# Patient Record
Sex: FEMALE | Race: WHITE
[De-identification: names, ages, dates, MRNs, and addresses within clinical notes are randomized per-mention and may not be internally consistent; named-entity substitution may affect disease eponyms.]

---

## 2017-01-19 ENCOUNTER — HOSPITAL ENCOUNTER (OUTPATIENT)
Dept: HOSPITAL 47 - RADMAMWWP | Age: 82
Discharge: HOME | End: 2017-01-19
Payer: MEDICARE

## 2017-01-19 DIAGNOSIS — Z12.31: Primary | ICD-10-CM

## 2017-01-20 NOTE — MM
Reason for exam: screening  (asymptomatic).

Last mammogram was performed 1 year and 3 months ago.



History:

Patient is postmenopausal and history of other cancer.

Cyst aspiration of the left breast, 1960.

Took estrogen for 10 years beginning at age 56.



Physical Findings:

A clinical breast exam by your physician is recommended on an annual basis and 

results should be correlated with mammographic findings.



MG Screening Mammo w CAD

Bilateral CC and MLO view(s) were taken.

Prior study comparison: October 23, 2015, right breast MG 3d work up w/cad RT.  

October 12, 2015, bilateral MG screening mammo w CAD.

There are scattered fibroglandular densities.  No significant changes when 

compared with prior studies.





ASSESSMENT: Benign, BI-RAD 2



RECOMMENDATION:

Routine screening mammogram of both breasts in 1 year.

## 2017-02-07 ENCOUNTER — HOSPITAL ENCOUNTER (OUTPATIENT)
Dept: HOSPITAL 47 - RADECHMAIN | Age: 82
Discharge: HOME | End: 2017-02-07
Payer: MEDICARE

## 2017-02-07 DIAGNOSIS — I27.2: ICD-10-CM

## 2017-02-07 DIAGNOSIS — I08.8: Primary | ICD-10-CM

## 2017-02-07 PROCEDURE — 93306 TTE W/DOPPLER COMPLETE: CPT

## 2017-02-07 PROCEDURE — 93880 EXTRACRANIAL BILAT STUDY: CPT

## 2017-02-07 NOTE — US
EXAMINATION TYPE: US carotid duplex BILAT

 

DATE OF EXAM: 2/7/2017 2:50 PM

 

COMPARISON: NONE

 

CLINICAL HISTORY: R55 syncope.

 

EXAM MEASUREMENTS: 

 

RIGHT:  Peak Systolic Velocity (PSV) cm/sec

----- Right CCA:  96.8  

----- Right ICA:  75.3     

----- Right ECA:  96.8   

ICA/CCA ratio:  0.8    

 

RIGHT:  End Diastole cm/sec

----- Right CCA:  27.3   

----- Right ICA:  25.2      

----- Right ECA:  8.1     

 

LEFT:  Peak Systolic Velocity (PSV) cm/sec

----- Left CCA:  98.0  

----- Left ICA:  109.4   

----- Left ECA:  90.4  

ICA/CCA ratio:  1.1  

 

LEFT:  End Diastole cm/sec

----- Left CCA:  24.8  

----- Left ICA:  27.3   

----- Left ECA:  10.9 

 

VERTEBRALS (direction of flow):

Right Vertebral: Antegrade

Left Vertebral: Antegrade

 

TECHNOLOGIST IMPRESSION:  Tortuous proximal CCA noted bilaterally. Mild to moderate intimal wall wheeler
ges are noted at bilateral carotid bifurcation , but PSV is wnl.

 

Grayscale images show mild eccentric plaque at bilateral carotid bulbs. Velocity measurements and rat
ios are within normal limits in visualized portion of both internal carotid arteries.

 

IMPRESSION:  No hemodynamically significant stenosis identified in either internal carotid artery.

## 2017-02-08 NOTE — ECHOF
Referral Reason:R55 syncope



MEASUREMENTS

--------

HEIGHT: 149.9 cm

WEIGHT: 63.5 kg

BP: 190/81

RVIDd:   2.8 cm     (< 3.3)

IVSd:   1.1 cm     (0.6 - 1.1)

LVIDd:   3.7 cm     (3.9 - 5.3)

LVPWd:   1.1 cm     (0.6 - 1.1)

IVSs:   1.6 cm

LVIDs:   2.5 cm

LVPWs:   1.5 cm

LA Diam:   3.5 cm     (2.7 - 3.8)

LAESV Index (A-L):   30.98 ml/m

Ao Diam:   3.1 cm     (2.0 - 3.7)

AV Cusp:   1.5 cm     (1.5 - 2.6)

MV EXCURSION:   9.414 mm     (> 18.000)

MV EF SLOPE:   46 mm/s     (70 - 150)

EPSS:   0.5 cm

MV E Ga:   1.04 m/s

MV DecT:   241 ms

MV A Ga:   1.22 m/s

MV E/A Ratio:   0.85 

AV maxP.63 mmHg

AV meanP.48 mmHg

RAP:   5.00 mmHg

RVSP:   40.01 mmHg







FINDINGS

--------

Sinus rhythm.

This was a technically good study.

The left ventricular size is normal.   There is 

borderline concentric left ventricular hypertrophy.   

Overall left ventricular systolic function is normal 

with, an EF between 60 - 65 %.

The right ventricle is normal in size and function.

LA is midly dilated 29-33ml/m2.

The right atrium is normal in size.

Aortic valve is trileaflet and is moderately thickened. 

  Trace amount of aortic regurgitation.    Peak/mean 

gradient across the Aortic Valve is 11.63mmHg / 

6.48mmHg.

The mitral valve leaflets are mildly thickened.   Mild 

mitral annular calcification present.   Mild mitral 

regurgitation is present.

Mild tricuspid regurgitation present.   There is mild 

pulmonary hypertension.   The right ventricular 

systolic pressure, as measured by Doppler, is 40.01mmHg.

Trace/mild (physiologic)  pulmonic regurgitation.

The aortic root size is normal.

Normal inferior vena cava with normal inspiratory 

collapse consistent with estimated right atrial 

pressure of  5 mmHg.

There is no pericardial effusion.



CONCLUSIONS

--------

1. Sinus rhythm.

2. Trace amount of aortic regurgitation.

3. Peak/mean gradient across the Aortic Valve is 11.63mmHg 

/ 6.48mmHg.

4. The mitral valve leaflets are mildly thickened.

5. Mild mitral annular calcification present.

6. Mild mitral regurgitation is present.

7. Mild tricuspid regurgitation present.

8. There is mild pulmonary hypertension.

9. The right ventricular systolic pressure, as measured by 

Doppler, is 40.01mmHg.

10. Trace/mild (physiologic)  pulmonic regurgitation.

11. The aortic root size is normal.

12. This was a technically good study.

13. Normal inferior vena cava with normal inspiratory 

collapse consistent with estimated right atrial 

pressure of  5 mmHg.

14. There is no pericardial effusion.

15. The left ventricular size is normal.

16. There is borderline concentric left ventricular 

hypertrophy.

17. Overall left ventricular systolic function is normal 

with, an EF between 60 - 65 %.

18. The right ventricle is normal in size and function.

19. LA is midly dilated 29-33ml/m2.

20. The right atrium is normal in size.

21. Aortic valve is trileaflet and is moderately thickened.





SONOGRAPHER: Danay Lindo RDCS

## 2018-04-24 ENCOUNTER — HOSPITAL ENCOUNTER (OUTPATIENT)
Dept: HOSPITAL 47 - RADMAMWWP | Age: 83
Discharge: HOME | End: 2018-04-24
Payer: MEDICARE

## 2018-04-24 DIAGNOSIS — Z13.820: ICD-10-CM

## 2018-04-24 DIAGNOSIS — Z12.31: Primary | ICD-10-CM

## 2018-04-24 PROCEDURE — 77080 DXA BONE DENSITY AXIAL: CPT

## 2018-04-24 PROCEDURE — 77063 BREAST TOMOSYNTHESIS BI: CPT

## 2018-04-24 PROCEDURE — 77067 SCR MAMMO BI INCL CAD: CPT

## 2018-04-24 NOTE — BD
EXAMINATION TYPE: MG DEXA axial skeleton.  

 

DATE OF EXAM: 4/24/2018

 

CLINICAL HISTORY:M81.0 Osteoporosis

 

 

Height:  58 inches

Weight:  146

 

FRAX RISK QUESTIONS:

Alcohol (3 or more units per day):  no

Family History (Parent hip fracture):  no

Glucocorticoids (More than 3mos):  no

           (Ex: prednisone, prednisolone, methylprednisolone, dexamethasone, and hydrocortisone).    
     

History of Fracture in Adulthood: no

Secondary Osteoporosis: 

  1.  Type 1 Diabetes: no

  2.  Hyperthyroidism: no

  3.  Menopause before 45: no

  4.  Malnutrition: no

  5.  Chronic liver disease: no

Rheumatoid Arthritis: no

Current Tobacco Use: no

 

RISK FACTORS 

HISTORY OF: 

Family History of Osteoporosis: no

Active: yes

Diet low in dairy products/other sources of calcium:  no

Postmenopausal woman: yes

Take estrogen and/or progesterone medications: not now

How long: age [age]

Lost more than 2 inches in height since high school: yes

Frequent falls: no

Poor Health: no

Hyperparathyroidism: no

Adrenal Insufficiency: no

 

MEDICATIONS: 

Prednisone or other steroids: no

Thyroid Medications: no 

Osteoporosis Medications: no

Additional Medications: blood pressure meds, cholesterol meds, multivitamin 

 

 

Additional History: knee surgery

 

 

EXAM MEASUREMENTS: 

Bone mineral densitometry was performed using the [employer] System.

Bone mineral density as measured about the Lumbar spine is:  

----- L1-L4(G/cm2): 1.464

T Score Values are as follows:

----- L2: 2.0

----- L3: 3.3

----- L4: 2.8

----- L1-L4: 2.4

Bone mineral density has: Decreased -3.0% since study of: 07/10/2013

 

Bone mineral density about the R hip (g/cm2): 0.951

Bone mineral density about the L hip (g/cm2): 0.979

T Score values are as follows:

-----R Neck: -0.6

-----L Neck: -0.4

-----R Total: -0.2

-----L Total: -0.1

Bone mineral density has: Decreased -1.8% since study of: 07/10/2013

 

 

IMPRESSION:

No evidence for osteoporosis or osteopenia.

 

 

 

 

 

NOTE:  T-SCORE=SD OF THE YOUNG ADULT MEAN.

## 2018-10-08 ENCOUNTER — HOSPITAL ENCOUNTER (OUTPATIENT)
Dept: HOSPITAL 47 - LABWHC1 | Age: 83
Discharge: HOME | End: 2018-10-08
Attending: NURSE PRACTITIONER
Payer: MEDICARE

## 2018-10-08 DIAGNOSIS — Z00.00: Primary | ICD-10-CM

## 2018-10-08 DIAGNOSIS — I10: ICD-10-CM

## 2018-10-08 DIAGNOSIS — E78.5: ICD-10-CM

## 2018-10-08 LAB
ALBUMIN SERPL-MCNC: 4.3 G/DL (ref 3.5–5)
ALP SERPL-CCNC: 118 U/L (ref 38–126)
ALT SERPL-CCNC: 11 U/L (ref 9–52)
ANION GAP SERPL CALC-SCNC: 10 MMOL/L
AST SERPL-CCNC: 25 U/L (ref 14–36)
BASOPHILS # BLD AUTO: 0.1 K/UL (ref 0–0.2)
BASOPHILS NFR BLD AUTO: 1 %
BUN SERPL-SCNC: 19 MG/DL (ref 7–17)
CALCIUM SPEC-MCNC: 10 MG/DL (ref 8.4–10.2)
CHLORIDE SERPL-SCNC: 107 MMOL/L (ref 98–107)
CHOLEST SERPL-MCNC: 212 MG/DL (ref ?–200)
CO2 SERPL-SCNC: 26 MMOL/L (ref 22–30)
EOSINOPHIL # BLD AUTO: 0.2 K/UL (ref 0–0.7)
EOSINOPHIL NFR BLD AUTO: 3 %
ERYTHROCYTE [DISTWIDTH] IN BLOOD BY AUTOMATED COUNT: 4.83 M/UL (ref 3.8–5.4)
ERYTHROCYTE [DISTWIDTH] IN BLOOD: 14.1 % (ref 11.5–15.5)
GLUCOSE SERPL-MCNC: 88 MG/DL (ref 74–99)
HCT VFR BLD AUTO: 40.9 % (ref 34–46)
HDLC SERPL-MCNC: 65 MG/DL (ref 40–60)
HGB BLD-MCNC: 13.7 GM/DL (ref 11.4–16)
LDLC SERPL CALC-MCNC: 124 MG/DL (ref 0–99)
LYMPHOCYTES # SPEC AUTO: 1.2 K/UL (ref 1–4.8)
LYMPHOCYTES NFR SPEC AUTO: 22 %
MCH RBC QN AUTO: 28.3 PG (ref 25–35)
MCHC RBC AUTO-ENTMCNC: 33.5 G/DL (ref 31–37)
MCV RBC AUTO: 84.6 FL (ref 80–100)
MONOCYTES # BLD AUTO: 0.3 K/UL (ref 0–1)
MONOCYTES NFR BLD AUTO: 6 %
NEUTROPHILS # BLD AUTO: 3.8 K/UL (ref 1.3–7.7)
NEUTROPHILS NFR BLD AUTO: 67 %
PLATELET # BLD AUTO: 288 K/UL (ref 150–450)
POTASSIUM SERPL-SCNC: 4.5 MMOL/L (ref 3.5–5.1)
PROT SERPL-MCNC: 7.6 G/DL (ref 6.3–8.2)
SODIUM SERPL-SCNC: 143 MMOL/L (ref 137–145)
T4 FREE SERPL-MCNC: 1.03 NG/DL (ref 0.78–2.19)
TRIGL SERPL-MCNC: 115 MG/DL (ref ?–150)
WBC # BLD AUTO: 5.7 K/UL (ref 3.8–10.6)

## 2018-10-08 PROCEDURE — 36415 COLL VENOUS BLD VENIPUNCTURE: CPT

## 2018-10-08 PROCEDURE — 85025 COMPLETE CBC W/AUTO DIFF WBC: CPT

## 2018-10-08 PROCEDURE — 84443 ASSAY THYROID STIM HORMONE: CPT

## 2018-10-08 PROCEDURE — 84439 ASSAY OF FREE THYROXINE: CPT

## 2018-10-08 PROCEDURE — 80061 LIPID PANEL: CPT

## 2018-10-08 PROCEDURE — 80053 COMPREHEN METABOLIC PANEL: CPT

## 2019-04-25 ENCOUNTER — HOSPITAL ENCOUNTER (OUTPATIENT)
Dept: HOSPITAL 47 - RADUSWWP | Age: 84
Discharge: HOME | End: 2019-04-25
Attending: FAMILY MEDICINE
Payer: MEDICARE

## 2019-04-25 DIAGNOSIS — I27.20: ICD-10-CM

## 2019-04-25 DIAGNOSIS — I08.8: Primary | ICD-10-CM

## 2019-04-25 DIAGNOSIS — I70.213: ICD-10-CM

## 2019-04-25 PROCEDURE — 93922 UPR/L XTREMITY ART 2 LEVELS: CPT

## 2019-04-25 PROCEDURE — 93306 TTE W/DOPPLER COMPLETE: CPT

## 2019-04-25 NOTE — ECHOF
Referral Reason:R01.0 cardiac murmur



MEASUREMENTS

--------

HEIGHT: 147.3 cm

WEIGHT: 65.8 kg

BP: 146/60

RVIDd:   3.0 cm     (< 3.3)

IVSd:   1.1 cm     (0.6 - 1.1)

LVIDd:   3.5 cm     (3.9 - 5.3)

LVPWd:   1.2 cm     (0.6 - 1.1)

IVSs:   1.6 cm

LVIDs:   2.3 cm

LVPWs:   1.6 cm

LA Diam:   3.5 cm     (2.7 - 3.8)

LAESV Index (A-L):   29.31 ml/m

Ao Diam:   2.7 cm     (2.0 - 3.7)

AV Cusp:   1.2 cm     (1.5 - 2.6)

EPSS:   0.3 cm

MV E Ga:   0.89 m/s

MV DecT:   292 ms

MV A Ga:   1.12 m/s

MV E/A Ratio:   0.80 

AV maxP.40 mmHg

AV maxP.40 mmHg

AV meanP.44 mmHg

RAP:   5.00 mmHg

RVSP:   36.35 mmHg

MV EF SLOPE:   70.50 mm/s     (70 - 150)

MV EXCURSION:   1.69 cm     (> 18.000)







FINDINGS

--------

Sinus rhythm.

This was a technically good study.

The left ventricular size is normal.   There is borderline concentric left ventricular hypertrophy.  
 Overall left ventricular systolic function is normal with, an EF between 60 - 65 %.

The right ventricle is normal in size.

LA is midly dilated 29-33ml/m2.

The right atrium is normal in size.

There is moderate aortic valve sclerosis.   Trace to mild aortic regurgitation.   There is mild aorti
c stenosis present.

The mitral valve leaflets are mildly thickened.   Moderate mitral annular calcification present.    T
he peak and mean MV gradients are {MV maxPG} {MV meanPG} as measured by doppler.

Mild tricuspid regurgitation present.   There is mild pulmonary hypertension.   The right ventricular
 systolic pressure, as measured by Doppler, is 36.35mmHg.

Trace/mild (physiologic)  pulmonic regurgitation.

The aortic root size is normal.

Normal inferior vena cava with normal inspiratory collapse consistent with estimated right atrial pre
ssure of  5 mmHg.

There is no pericardial effusion.



CONCLUSIONS

--------

1. Sinus rhythm.

2. This was a technically good study.

3. The left ventricular size is normal.

4. There is borderline concentric left ventricular hypertrophy.

5. Overall left ventricular systolic function is normal with, an EF between 60 - 65 %.

6. The right ventricle is normal in size.

7. LA is midly dilated 29-33ml/m2.

8. The right atrium is normal in size.

9. There is moderate aortic valve sclerosis.

10. Trace to mild aortic regurgitation.

11. There is mild aortic stenosis present.

12. The mitral valve leaflets are mildly thickened.

13. Moderate mitral annular calcification present.

14. The peak and mean MV gradients are {MV maxPG} {MV meanPG} as measured by doppler.

15. Mild tricuspid regurgitation present.

16. There is mild pulmonary hypertension.

17. The right ventricular systolic pressure, as measured by Doppler, is 36.35mmHg.

18. Trace/mild (physiologic)  pulmonic regurgitation.

19. The aortic root size is normal.

20. Normal inferior vena cava with normal inspiratory collapse consistent with estimated right atrial
 pressure of  5 mmHg.

21. There is no pericardial effusion.





SONOGRAPHER: BRUCE Hardy

## 2021-11-04 ENCOUNTER — HOSPITAL ENCOUNTER (OUTPATIENT)
Dept: HOSPITAL 47 - RADCTMAIN | Age: 86
Discharge: HOME | End: 2021-11-04
Attending: FAMILY MEDICINE
Payer: MEDICARE

## 2021-11-04 DIAGNOSIS — M53.3: Primary | ICD-10-CM

## 2021-11-04 DIAGNOSIS — M54.50: ICD-10-CM

## 2021-11-04 PROCEDURE — 72131 CT LUMBAR SPINE W/O DYE: CPT

## 2021-11-04 PROCEDURE — 72192 CT PELVIS W/O DYE: CPT

## 2021-11-04 NOTE — CT
EXAMINATION TYPE: CT lumbar spine wo con, CT pelvis wo con

 

DATE OF EXAM: 11/4/2021 11:06 AM

 

COMPARISON: None.

 

HISTORY: Pain lumbar and pelvic in particular coccyx pain

 

CT DLP: 658.8 mGycm

Automated exposure control for dose reduction was used.

 

Unenhanced CT of the lumbar spine and pelvis are performed.  Bone and soft tissue window settings are
 submitted as well as coronal and sagittal reconstructions. 

 

There are 5 lumbar type vertebra. Native osseous structures are demineralized. There is grade 1 retro
listhesis of L1 on L2. There is grade 1 anterolisthesis of L5 on S1. Vertebral body heights are prese
rved. Moderate-to-severe multilevel anterior lateral spurring. There is levoconvex scoliosis centered
 at L4 level. Moderate to severe multilevel disc space narrowing. Posterior spur disc complexes effac
e the anterior thecal sac at all levels on sagittal images. 

 

Axial images L2-L3 level show moderate broad-based posterior disc protrusion and spurring along with 
mild facet arthropathy causing mild left greater than right neural foraminal narrowing.

 

Axial images at L3-L4 level show moderate to advanced facet arthropathy effacing posterior lateral th
ecal sac. There is moderate broad-based disc bulge effacing the anterior thecal sac. There is moderat
e bilateral neural foraminal narrowing.

 

Axial images at the L4-L5 level show moderate to advanced right and mild to moderate left facet arthr
opathy. There is broad disc bulge with right lateral disc protrusion component. There is mild left an
d moderate to severe right-sided neural foraminal narrowing.

 

Axial images at L5-S1 level show moderate to advanced facet arthropathy bilaterally. There is broad-b
ased right foraminal/extraforaminal disc protrusion axial image 53 likely effacing right L5 nerve sag
ittal image 20. There is effacement of the anterior and posterior lateral thecal sac due to spondylol
isthesis. There is moderate-to-severe left-sided neural foraminal narrowing sagittal image 35.

 

There is bilateral calcifications likely nephrolithiasis, largest 5 mm mid to lower pole right kidney
 coronal image 29.

 

Images of the pelvis show moderate axial joint space loss and moderate head neck collar spurring in b
oth hips. Demineralization is present. Sacroiliac joints are symmetric and well within normal limits.
 Pubic symphysis shows mild spurring and mild to moderate narrowing. Sacrum and coccyx appear within 
normal limits. No acute fracture is evident.

 

Some diverticula in the sigmoid colon. Anteverted uterus with scattered pelvic phleboliths. Partial v
isualization of coils in the anterior abdominal wall from ventral wall hernia repair surgery. Surgica
l sutures in bowel loops right lower quadrant are noted.

 

 

 

IMPRESSION: Demineralization with multilevel spondylolisthesis and degenerative changes in the lumbar
 spine as detailed above. No suspicious acute findings in the pelvis.

## 2025-02-04 ENCOUNTER — HOSPITAL ENCOUNTER (EMERGENCY)
Dept: HOSPITAL 47 - EC | Age: 89
Discharge: HOME | End: 2025-02-04
Payer: MEDICARE

## 2025-02-04 VITALS — TEMPERATURE: 97.9 F | SYSTOLIC BLOOD PRESSURE: 145 MMHG | DIASTOLIC BLOOD PRESSURE: 71 MMHG | HEART RATE: 77 BPM

## 2025-02-04 VITALS — RESPIRATION RATE: 18 BRPM

## 2025-02-04 DIAGNOSIS — S09.90XA: Primary | ICD-10-CM
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
## 2025-02-04 PROCEDURE — 72125 CT NECK SPINE W/O DYE: CPT

## 2025-02-04 PROCEDURE — 99283 EMERGENCY DEPT VISIT LOW MDM: CPT

## 2025-02-04 PROCEDURE — 70450 CT HEAD/BRAIN W/O DYE: CPT

## 2025-02-04 NOTE — CT
EXAMINATION TYPE: CT brain topher wo con

 

DATE OF EXAM: 2/4/2025 9:54 AM

 

COMPARISON: None. 

 

CLINICAL INDICATION: Female, 89 years old with history of pain, FALL, PAIN POSTERIOR HEAD, pain

 

TECHNIQUE: CT of the brain is performed utilizing 3 mm thick sections through the posterior fossa and
 3 mm thick sections through the remaining calvarium.  Study is performed within 24 hours of arrival 
to the hospital.

Contrast used: mL of , (none if empty)

CT DLP: 1299.4 mGycm, Automated exposure control for dose reduction was used.

 

FINDINGS:

No abnormal hyperdensity is present to suggest an acute intracranial hemorrhage.

No mass lesion is evident.

No acute infarcts are evident.  Mild periventricular white matter hypodensity is present, likely on t
he basis of chronic white matter ischemic changes.

Ventricles and sulci are appropriate for the patient age.  

 

Paranasal sinuses and mastoid air cells within the field-of-view are clear. 

 

IMPRESSIONS:

1. No acute intracranial process. Follow-up MRI can be performed as clinically indicated. 

2. Mild chronic appearing periventricular white matter ischemic type changes

 

==============================================================

CT cervical spine.

 

COMPARISON: None

 

TECHNIQUE: CT of the cervical spine is performed in the axial plane at 2 mm thick sections.  Reconstr
ucted images in the coronal, and sagittal plane are reviewed on the computer. 

 

FINDINGS:

No acute fractures are evident.

Vertebral body alignment is normal.

Disc space narrowing is present throughout the cervical spine.

Vertebral body heights are preserved.

No spinal canal stenosis is evident. Posterior endplate spurring is present C3-4, C4-5, C5-6.

Uncovertebral joint disease contributing to bilateral foraminal narrowing C4-5, C5-6 

 

IMPRESSION:

1. Degenerative disc changes throughout cervical spine.

2. Foraminal narrowing predominantly at C4-5 C5-6.

 

[facility], Workstation: [id number], 2/4/2025 9:57 AM

## 2025-02-04 NOTE — ED
Head Injury HPI





- General


Chief complaint: Head Injury


Stated complaint: Fall, head injury


Time Seen by Provider: 25 08:59


Source: patient, family, RN notes reviewed


Mode of arrival: wheelchair


Limitations: no limitations





- History of Present Illness


Initial comments: 


89-year-old female presents emerged from chief complaint of slip and fall, head 

injury.  Patient states she got out of the vehicle slipped on ice striking her 

head.  She states that she did strike the ground but has minimal headache denies

any blood thinners denies any neck pain no back pain.  Patient states that she 

did need help getting up but that is due to a bad knee.  She denies any 

extremity injuries.








- Related Data


                                Home Medications











 Medication  Instructions  Recorded  Confirmed


 


Lisinopril/Hydrochlorothiazide 1 tab PO QAM 11/10/14 12/11/14





[Lisinopril-Hctz 20-12.5 mg Tab]   


 


Metoprolol Tartrate [Lopressor] 50 mg PO QAM 11/10/14 12/11/14


 


Simvastatin 80 mg PO DAILY 11/10/14 12/11/14


 


Omeprazole 40 mg PO AC-BRKFST 14


 


gemfibroziL [Gemfibrozil] 600 mg PO DAILY 14








                                  Previous Rx's











 Medication  Instructions  Recorded


 


HYDROcodone/APAP 7.5-325MG [Norco 1 each PO Q4H PRN #60 tab 14





7.5]  











Allergies/Adverse reactions: 


                                    Allergies











Allergy/AdvReac Type Severity Reaction Status Date / Time


 


No Known Allergies Allergy   Verified 25 09:00














Review of Systems


ROS Statement: 


Those systems with pertinent positive or pertinent negative responses have been 

documented in the HPI.





ROS Other: All systems not noted in ROS Statement are negative.





Past Medical History


Past Medical History: Hyperlipidemia, Hypertension


History of Any Multi-Drug Resistant Organisms: None Reported


Past Surgical History: Bowel Resection,  Section, Orthopedic Surgery


Additional Past Surgical History / Comment(s):  x 3, bowel resection 2 

years ago, knee replacement 4 years ago


Past Anesthesia/Blood Transfusion Reactions: No Reported Reaction


Past Psychological History: Anxiety


Smoking Status: Never smoker


Past Alcohol Use History: None Reported


Past Drug Use History: None Reported





General Exam


Limitations: no limitations


General appearance: alert, in no apparent distress


Head exam: Present: atraumatic, normocephalic, normal inspection


Eye exam: Present: normal appearance, PERRL, EOMI.  Absent: scleral icterus, 

conjunctival injection, periorbital swelling


ENT exam: Present: normal exam, normal oropharynx, mucous membranes moist


Neck exam: Present: normal inspection, full ROM.  Absent: tenderness, 

meningismus, lymphadenopathy


Respiratory exam: Present: normal lung sounds bilaterally.  Absent: respiratory 

distress, wheezes, rales, rhonchi, stridor


Cardiovascular Exam: Present: regular rate, normal rhythm, normal heart sounds. 

Absent: systolic murmur, diastolic murmur, rubs, gallop, clicks


GI/Abdominal exam: Present: soft, normal bowel sounds.  Absent: distended, 

tenderness, guarding, rebound, rigid


Neurological exam: Present: alert, oriented X3, CN II-XII intact, reflexes 

normal.  Absent: motor sensory deficit





Course


                                   Vital Signs











  25





  08:54 10:06


 


Temperature 97.8 F 97.9 F


 


Pulse Rate 79 77


 


Respiratory 18 18





Rate  


 


Blood Pressure 150/67 145/71


 


O2 Sat by Pulse 100 100





Oximetry  














Medical Decision Making





- Medical Decision Making


Was pt. sent in by a medical professional or institution (, PA, NP, urgent 

care, hospital, or nursing home...) When possible be specific


@ -No


Did you speak to anyone other than the patient for history (EMS, parent, family,

police, friend...)? What history was obtained from this source 


@ -No


Did you review nursing and triage notes (agree or disagree)? Why? 


@ -I reviewed and agree with nursing and triage notes


Were old charts reviewed (outside hosp., previous admission, EMS record, old 

EKG, old radiological studies, urgent care reports/EKG's, nursing home records)?

Report findings 


@ -No old charts were reviewed


Differential Diagnosis (chest pain, altered mental status, abdominal pain women,

abdominal pain men, vaginal bleeding, weakness, fever, dyspnea, syncope, 

headache, dizziness, GI bleed, back pain, seizure, CVA, palpatations, mental 

health, musculoskeletal)? 


@ -Differential Headache:


Migraine, tension, cluster, carbon monoxide, central venous thrombosis, pension 

karma temporal arteritis, acute closure glaucoma, intercranial hemorrhage, 

mastoiditis, sinusitis, head injury, this is not meant to be an all-inclusive 

list.





EKG interpreted by me (3pts min.).


@ -None


X-rays interpreted by me (1pt min.).


@ -None done


CT interpreted by me (1pt min.).


@ -CT brain, C-spine showing no acute intracranial hemorrhage no cervical 

fracture


U/S interpreted by me (1pt. min.).


@ -None done


What testing was considered but not performed or refused? (CT, X-rays, U/S, lab

s)? Why?


@ -None


What meds were considered but not given or refused? Why?


@ -None


Did you discuss the management of the patient with other professionals 

(professionals i.e. DrElizabeth, PA, NP, lab, RT, psych nurse, , , 

teacher, , )? Give summary


@ -No


Was smoking cessation discussed for >3mins.?


@ -No


Was critical care preformed (if so, how long)?


@ -No


Were there social determinants of health that impacted care today? How? 

(Homelessness, low income, unemployed, alcoholism, drug addiction, 

transportation, low edu. Level, literacy, decrease access to med. care, snf, 

rehab)?


@ -No


Was there de-escalation of care discussed even if they declined (Discuss DNR or 

withdrawal of care, Hospice)? DNR status


@ -No


What co-morbidities impacted this encounter? (DM, HTN, Smoking, COPD, CAD, 

Cancer, CVA, ARF, Chemo, Hep., AIDS, mental health diagnosis, sleep apnea, 

morbid obesity)?


@ -None


Was patient admitted / discharged? Hospital course, mention meds given and 

route, prescriptions, significant lab abnormalities, going to OR and other 

pertinent info.


@ -Discharge patient presented for head injury patient has no acute findings.  

Patient discharged in stable condition return parameters discussed.


Undiagnosed new problem with uncertain prognosis?


@ -No


Drug Therapy requiring intensive monitoring for toxicity (Heparin, Nitro, 

Insulin, Cardizem)?


@ -No


Were any procedures done?


@ -No


Diagnosis/symptom?


@ -Close head injury, slip and fall


Acute, or Chronic, or Acute on Chronic?


@ -Acute


Uncomplicated (without systemic symptoms) or Complicated (systemic symptoms)?


@ -uncomplicated


Side effects of treatment?


@ -No


Exacerbation, Progression, or Severe Exacerbation?


@ -No


Poses a threat to life or bodily function? How? (Chest pain, USA, MI, pneumonia,

PE, COPD, DKA, ARF, appy, cholecystitis, CVA, Diverticulitis, Homicidal, 

Suicidal, threat to staff... and all critical care pts)


@ -No








Disposition


Clinical Impression: 


 Fall, Closed head injury





Disposition: HOME SELF-CARE


Condition: Stable


Instructions (If sedation given, give patient instructions):  Head Injury (ED)


Additional Instructions: 


Please return to the Emergency Department if symptoms worsen or any other 

concerns.


Is patient prescribed a controlled substance at d/c from ED?: No


Referrals: 


Librado Cortes Jr, DO [Primary Care Provider] - 1-2 days


Time of Disposition: 10:02